# Patient Record
Sex: MALE | Race: WHITE | NOT HISPANIC OR LATINO | URBAN - METROPOLITAN AREA
[De-identification: names, ages, dates, MRNs, and addresses within clinical notes are randomized per-mention and may not be internally consistent; named-entity substitution may affect disease eponyms.]

---

## 2017-10-25 ENCOUNTER — OFFICE VISIT (OUTPATIENT)
Dept: ORTHOPEDIC SURGERY | Facility: CLINIC | Age: 52
End: 2017-10-25

## 2017-10-25 DIAGNOSIS — M17.12 PRIMARY OSTEOARTHRITIS OF LEFT KNEE: ICD-10-CM

## 2017-10-25 DIAGNOSIS — M25.562 LEFT KNEE PAIN, UNSPECIFIED CHRONICITY: Primary | ICD-10-CM

## 2017-10-25 PROCEDURE — 73560 X-RAY EXAM OF KNEE 1 OR 2: CPT | Performed by: ORTHOPAEDIC SURGERY

## 2017-10-25 PROCEDURE — 99203 OFFICE O/P NEW LOW 30 MIN: CPT | Performed by: ORTHOPAEDIC SURGERY

## 2017-10-25 RX ORDER — VALSARTAN 320 MG/1
TABLET ORAL
Refills: 0 | COMMUNITY
Start: 2017-10-21

## 2017-11-02 PROBLEM — M17.12 PRIMARY OSTEOARTHRITIS OF LEFT KNEE: Status: ACTIVE | Noted: 2017-11-02

## 2017-11-02 PROBLEM — M25.562 LEFT KNEE PAIN: Status: ACTIVE | Noted: 2017-11-02

## 2017-12-13 ENCOUNTER — OFFICE VISIT (OUTPATIENT)
Dept: ORTHOPEDIC SURGERY | Facility: CLINIC | Age: 52
End: 2017-12-13

## 2017-12-13 VITALS — BODY MASS INDEX: 31.83 KG/M2 | WEIGHT: 235 LBS | HEIGHT: 72 IN | TEMPERATURE: 98.8 F

## 2017-12-13 DIAGNOSIS — M17.12 PRIMARY OSTEOARTHRITIS OF LEFT KNEE: ICD-10-CM

## 2017-12-13 PROCEDURE — 20610 DRAIN/INJ JOINT/BURSA W/O US: CPT | Performed by: ORTHOPAEDIC SURGERY

## 2017-12-13 RX ADMIN — LIDOCAINE HYDROCHLORIDE 2 ML: 10 INJECTION, SOLUTION EPIDURAL; INFILTRATION; INTRACAUDAL; PERINEURAL at 09:19

## 2017-12-13 NOTE — PROGRESS NOTES
Gilberto Simpson  ?  1965  ?  Chief Complaint   Patient presents with   • Left Knee - Follow-up, Pain     ?  HPI the patient is here today for a left knee Synvisc one injection. He has medial sided pain and discomfort. There is difficulty in going up and down the steps. He finds it very difficult to climb up and down the steps.We are trying all forms of non op care before proceeding with a total knee replacement.    Physical Exam    Review of Systems    Joint/Body Part Specific Exam:   left knee. Patient has crepitus throughout range of motion. Positive patellar grind test. Mild effusion. Lachman is negative. Pivot shift is negative. Anterior and posterior drawer signs are negative. Significant joint line tenderness is noted on the medial aspect of the knee. Patient has a varus orientation of the knee. Range of motion in flexion is for 0- 120 degrees. Neurovascular status intact.  Dorsalis pedis and posterior tibial artery pulses are palpable. Common peroneal nerve function is well preserved. Patients gait is cautious and antalgic. Skin and soft tissues are swollen; consistent with synovitis and effusion  X-Ray Report:    Diagnostics:    Large Joint Arthrocentesis  Date/Time: 12/13/2017 9:19 AM  Consent given by: patient  Site marked: site marked  Timeout: Immediately prior to procedure a time out was called to verify the correct patient, procedure, equipment, support staff and site/side marked as required   Supporting Documentation  Indications: pain   Procedure Details  Location: knee - L knee  Preparation: Patient was prepped and draped in the usual sterile fashion  Needle size: 18 G  Approach: anteromedial  Medications administered: 48 mg hylan 48 MG/6ML; 2 mL lidocaine PF 1% 1 %          ?  ?  Plan      · Ice pack for 48 hrs     · Injected patients left knee  joint with Visco supplementation     · Ace wrap for swelling PRN    · Elevation for swelling PRN    · Tablet Ibuprofen 600 mg P.O. BID x 5 Days with  meals    · Call office for any problems  With procedure    · Home Physical Therapy Program discussed with patient    · F/U in 3 months for Re-evaluation.    Use a supportive brace to prevent the knee from buckling and giving out.    He will eventually most  Certainly need a total knee replacement.

## 2017-12-17 RX ORDER — LIDOCAINE HYDROCHLORIDE 10 MG/ML
2 INJECTION, SOLUTION EPIDURAL; INFILTRATION; INTRACAUDAL; PERINEURAL
Status: COMPLETED | OUTPATIENT
Start: 2017-12-13 | End: 2017-12-13

## 2018-06-13 ENCOUNTER — TELEPHONE (OUTPATIENT)
Dept: ORTHOPEDIC SURGERY | Facility: CLINIC | Age: 53
End: 2018-06-13

## 2018-06-13 NOTE — TELEPHONE ENCOUNTER
Left a message for the patient to inform him that his insurance company has denied Synvisc One due to it not being medically necessary. He does have the option to pay out of pocket. It is $900 but he would only have to pay down $500 the day of the injection and the remanding $400 would have to be paid before he can get another synvisc one injection in 6 months. He does also have the option of getting a steroid injection. I have asked the patient to let us know what he would like to do.